# Patient Record
Sex: FEMALE | Race: BLACK OR AFRICAN AMERICAN | NOT HISPANIC OR LATINO | Employment: UNEMPLOYED | URBAN - METROPOLITAN AREA
[De-identification: names, ages, dates, MRNs, and addresses within clinical notes are randomized per-mention and may not be internally consistent; named-entity substitution may affect disease eponyms.]

---
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
## 2025-02-19 ENCOUNTER — OFFICE VISIT (OUTPATIENT)
Dept: PRIMARY CARE | Facility: HOSPITAL | Age: 20
End: 2025-02-19
Payer: MEDICAID

## 2025-02-19 ENCOUNTER — PHARMACY VISIT (OUTPATIENT)
Dept: PHARMACY | Facility: CLINIC | Age: 20
End: 2025-02-19
Payer: MEDICAID

## 2025-02-19 VITALS
SYSTOLIC BLOOD PRESSURE: 124 MMHG | OXYGEN SATURATION: 97 % | HEART RATE: 86 BPM | DIASTOLIC BLOOD PRESSURE: 88 MMHG | BODY MASS INDEX: 46.44 KG/M2 | HEIGHT: 63 IN | TEMPERATURE: 98.2 F | WEIGHT: 262.1 LBS

## 2025-02-19 DIAGNOSIS — Z23 NEEDS FLU SHOT: ICD-10-CM

## 2025-02-19 DIAGNOSIS — L30.1 DYSHIDROTIC ECZEMA: Primary | ICD-10-CM

## 2025-02-19 DIAGNOSIS — E66.01 CLASS 3 SEVERE OBESITY WITHOUT SERIOUS COMORBIDITY WITH BODY MASS INDEX (BMI) OF 45.0 TO 49.9 IN ADULT, UNSPECIFIED OBESITY TYPE: ICD-10-CM

## 2025-02-19 DIAGNOSIS — E66.813 CLASS 3 SEVERE OBESITY WITHOUT SERIOUS COMORBIDITY WITH BODY MASS INDEX (BMI) OF 45.0 TO 49.9 IN ADULT, UNSPECIFIED OBESITY TYPE: ICD-10-CM

## 2025-02-19 PROCEDURE — RXMED WILLOW AMBULATORY MEDICATION CHARGE

## 2025-02-19 RX ORDER — TRIAMCINOLONE ACETONIDE 1 MG/G
CREAM TOPICAL 2 TIMES DAILY
Qty: 30 G | Refills: 0 | Status: SHIPPED | OUTPATIENT
Start: 2025-02-19

## 2025-02-19 ASSESSMENT — ENCOUNTER SYMPTOMS
LOSS OF SENSATION IN FEET: 0
DEPRESSION: 0
OCCASIONAL FEELINGS OF UNSTEADINESS: 0

## 2025-02-19 ASSESSMENT — PAIN SCALES - GENERAL: PAINLEVEL_OUTOF10: 0-NO PAIN

## 2025-02-19 ASSESSMENT — PATIENT HEALTH QUESTIONNAIRE - PHQ9
1. LITTLE INTEREST OR PLEASURE IN DOING THINGS: NOT AT ALL
2. FEELING DOWN, DEPRESSED OR HOPELESS: NOT AT ALL
SUM OF ALL RESPONSES TO PHQ9 QUESTIONS 1 AND 2: 0

## 2025-02-19 NOTE — PATIENT INSTRUCTIONS
Dear Priscilla Jones    Thank you for visiting the [facility] at . Today we discussed the followin. Rash: This rash is likely dyshidrotic eczema. We have prescribed a topical cream (called triamcinolone) to help.  2. You will have lab work done today. We will call you with the results  3. You will receive your flu vaccine today.  4. Weight loss: Please continue dancing and any other physical activity you enjoy for at least 30 minutes per day, 5 days per week. Please see the attached handouts about a good, healthy diet to eat to help with weight loss. If you have any other questions or concerns please feel free to call our clinic.      Please follow up with us in 1 year.      Please do not hesitate to call us if you have any questions, concerns, or need any refills on medications. It was a pleasure being part of your care.    [facility], [facility]  Phone: [phone]

## 2025-02-19 NOTE — PROGRESS NOTES
Chief complaint:    HPI:  Priscilla Jones is a 19 y.o. female with no pmh presenting for annual physical.      Rash on bilateral hands: Flares up when washes dishes, uses certain soaps. No rahs anywhere else on body. Previously prescribed hydrocortisone cream which helped. On chart review was noted to have a rash on face in Jan 2024. She states she hasn't seen this since then. No joint pain, fatigue, f/c, vision or hearing changes, dyspnea, cp, abd pain, constipation, diarrhea, changes in voiding habits, extremity swelling, abnormal vaginal discharge.     LMP:  few weeks ago, regular every month, lasts 5 days., was not heavy. Denies being sexually active.    Weight loss: Started dancing 2 weeks ago and enjoys it. Eats a variety of foods and likes vegetables but also snacks on things like cookies, donuts, M&M, candy.         Medications:  Current Outpatient Medications   Medication Instructions    hydrocortisone 2.5 % ointment Topical, 2 times daily       Allergies:  No Known Allergies    Past medical history:  Past Medical History:   Diagnosis Date    Acute atopic conjunctivitis, left eye 06/13/2016    Allergic conjunctivitis of left eye    Lateral malleolar fracture 05/13/2015       Surgical history:  None    Family history:  HTN- Mom  T2DM- Grandmother and great gma (maternal)  Ovarian Cancer- Aunt (maternal) in late 20s  Stomach- Uncle (maternal)  Cervical Cancer- Grandmother (maternal) in her 20s    Social history:  - College at [employer]-undecided major  - Tobacco: denies  - Alcohol: denies  - Drugs: denies     Health maintenance:  Health Maintenance   Topic Date Due    Yearly Adult Physical  Never done    Lipid Panel  Never done    HIV Screening  Never done    Hearing Screening (1) Never done    Hepatitis C Screening  Never done    Meningococcal B Vaccine (2 of 2 - Bexsero SCDM 2-dose series) 07/05/2024    Influenza Vaccine (1) 09/01/2024    COVID-19 Vaccine (1 - 2024-25 season) Never done     "DTaP/Tdap/Td Vaccines (7 - Td or Tdap) 07/21/2027    Zoster Vaccines (1 of 2) 11/29/2055    HIB Vaccines  Completed    Hepatitis B Vaccines  Completed    IPV Vaccines  Completed    Hepatitis A Vaccines  Completed    MMR Vaccines  Completed    Varicella Vaccines  Completed    HPV Vaccines  Completed    Meningococcal Vaccine  Aged Out    Rotavirus Vaccines  Aged Out    Pneumococcal Vaccine: Pediatrics and At-Risk Adult Patients  Aged Out       Sexual History  Currently Sexually Active: No, never  LMP: Date 2 weeks ago        Review of systems:  Review of Systems   Per HPI    Vitals:  Vitals:    02/19/25 1359   BP: 124/88   Pulse: 100   Temp: 36.8 °C (98.2 °F)   SpO2: 97%        Physical exam:  Physical Exam  Physical exam  General: Awake, alert,  HEENT: Normocephalic, atraumatic. PEERL. EOMI. MMM. Some ear wax in L ear  Cardiac: Normal RR. S1&S2.   Respiratory: Lungs CTAB. No wheezes, rhonchi, rales, normal work of breathing on RA  Abdomen: +BS. Soft, non tender, non distended  Extremities: No peripheral edema b/l, no asymmetry noted  MSK: No joint pain. ROM intact, strength intact  Skin: Flat flesh colored, slightly erythematous rash in interdigit webs of R>L hand. No other rashes noted  Neuro: Aox4. Moving all limbs spontaneously, follows commands  Psych: Appropriate mood and behavior. Coherent thought process       Labs:  Lab Results   Component Value Date    WBC 7.4 02/22/2023    HGB 11.1 (L) 02/22/2023    HCT 35.6 (L) 02/22/2023    MCV 86 02/22/2023     (H) 02/22/2023       Lab Results   Component Value Date    GLUCOSE 85 02/22/2023    CALCIUM 9.9 02/22/2023     02/22/2023    K 4.7 02/22/2023    CO2 28 (H) 02/22/2023     02/22/2023    BUN 16 02/22/2023    CREATININE 0.85 02/22/2023       Lab Results   Component Value Date    HGBA1C 5.5 02/22/2023        No results found for: \"CHOL\"  No results found for: \"HDL\"  No results found for: \"LDLCALC\"  No results found for: \"TRIG\"  No components found " "for: \"CHOLHDL\"    Imaging:  No results found.    Assessment and plan:  Priscilla Jones is a 19 y.o. female with no pmh presenting for annual physical. Patient's rash likely dyshidrotic eczema so will rx triamciniolone cream. Will also get basic labs and receive flu vaccine today.    #Dyshidrotic eczema  - Rx for triamcinolone cream    #Obesity  - Counseled on lifestyle modifications including exercise and diet        HEALTH MAINTENANCE   Vaccines  Influenza: to receive in clinic today  HPV: In 2017 and 2018      Follow-up in 1 year.    Patient and plan discussed with attending physician Dr. Garvey.    Nichole Neville MD   "

## 2025-02-20 LAB
ALBUMIN SERPL-MCNC: 4.3 G/DL (ref 3.6–5.1)
BASOPHILS # BLD AUTO: 32 CELLS/UL (ref 0–200)
BASOPHILS NFR BLD AUTO: 0.5 %
BUN SERPL-MCNC: 15 MG/DL (ref 7–20)
BUN/CREAT SERPL: NORMAL (CALC) (ref 6–22)
CALCIUM SERPL-MCNC: 9.8 MG/DL (ref 8.9–10.4)
CHLORIDE SERPL-SCNC: 106 MMOL/L (ref 98–110)
CHOLEST SERPL-MCNC: 185 MG/DL
CHOLEST/HDLC SERPL: 4.3 (CALC)
CO2 SERPL-SCNC: 25 MMOL/L (ref 20–32)
CREAT SERPL-MCNC: 0.75 MG/DL (ref 0.5–0.96)
EGFRCR SERPLBLD CKD-EPI 2021: 118 ML/MIN/1.73M2
EOSINOPHIL # BLD AUTO: 132 CELLS/UL (ref 15–500)
EOSINOPHIL NFR BLD AUTO: 2.1 %
ERYTHROCYTE [DISTWIDTH] IN BLOOD BY AUTOMATED COUNT: 14.4 % (ref 11–15)
EST. AVERAGE GLUCOSE BLD GHB EST-MCNC: 123 MG/DL
EST. AVERAGE GLUCOSE BLD GHB EST-SCNC: 6.8 MMOL/L
GLUCOSE SERPL-MCNC: 86 MG/DL (ref 65–99)
HBA1C MFR BLD: 5.9 % OF TOTAL HGB
HCT VFR BLD AUTO: 37 % (ref 35–45)
HDLC SERPL-MCNC: 43 MG/DL
HGB BLD-MCNC: 11.7 G/DL (ref 11.7–15.5)
LDLC SERPL CALC-MCNC: 113 MG/DL (CALC)
LYMPHOCYTES # BLD AUTO: 2659 CELLS/UL (ref 850–3900)
LYMPHOCYTES NFR BLD AUTO: 42.2 %
MAGNESIUM SERPL-MCNC: 2.3 MG/DL (ref 1.5–2.5)
MCH RBC QN AUTO: 26 PG (ref 27–33)
MCHC RBC AUTO-ENTMCNC: 31.6 G/DL (ref 32–36)
MCV RBC AUTO: 82.2 FL (ref 80–100)
MONOCYTES # BLD AUTO: 372 CELLS/UL (ref 200–950)
MONOCYTES NFR BLD AUTO: 5.9 %
NEUTROPHILS # BLD AUTO: 3106 CELLS/UL (ref 1500–7800)
NEUTROPHILS NFR BLD AUTO: 49.3 %
NONHDLC SERPL-MCNC: 142 MG/DL (CALC)
PHOSPHATE SERPL-MCNC: 4 MG/DL (ref 2.7–5)
PLATELET # BLD AUTO: 433 THOUSAND/UL (ref 140–400)
PMV BLD REES-ECKER: 9.9 FL (ref 7.5–12.5)
POTASSIUM SERPL-SCNC: 4.4 MMOL/L (ref 3.8–5.1)
RBC # BLD AUTO: 4.5 MILLION/UL (ref 3.8–5.1)
SODIUM SERPL-SCNC: 139 MMOL/L (ref 135–146)
TRIGL SERPL-MCNC: 176 MG/DL
WBC # BLD AUTO: 6.3 THOUSAND/UL (ref 3.8–10.8)

## 2025-02-21 ENCOUNTER — TELEPHONE (OUTPATIENT)
Dept: PRIMARY CARE | Facility: HOSPITAL | Age: 20
End: 2025-02-21
Payer: MEDICAID

## 2025-02-21 DIAGNOSIS — E78.00 HIGH CHOLESTEROL: Primary | ICD-10-CM

## 2025-02-21 DIAGNOSIS — R73.03 PREDIABETES: ICD-10-CM

## 2025-02-21 NOTE — TELEPHONE ENCOUNTER
Called patient to discuss lab results. Reinforced importance of lifestyle modifications. Will repeat labs and see in clinic in 3-4 months. All questions answered

## 2025-05-28 DIAGNOSIS — E78.00 HIGH CHOLESTEROL: ICD-10-CM

## 2025-05-28 DIAGNOSIS — R73.03 PREDIABETES: ICD-10-CM

## 2025-06-24 ENCOUNTER — TELEPHONE (OUTPATIENT)
Dept: PRIMARY CARE | Facility: HOSPITAL | Age: 20
End: 2025-06-24
Payer: MEDICAID